# Patient Record
(demographics unavailable — no encounter records)

---

## 2024-10-16 NOTE — ASSESSMENT
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

## 2024-10-16 NOTE — HISTORY OF PRESENT ILLNESS
[de-identified] : Patient is a 62 y/o F who presents a chief complaint of newly diagnosed right arm melanoma, biopsied by Dr. Aguirre. Initially seen by Dr. Bernstein in 2015.   Dermpath 9/24/24: A. Right lower leg- solar keratosis B. Right arm- nodular melanoma which is focally ulceration, thickness at least 0.7 mm, tumor extends to the deep margin C. Right chest- c/w hyperplastic/hyperkeratotic solar keratosis/hypertrophic actinic keratosis which is inflamed   PMHx: History of breast cancer.  History of basal cell cancer 2000 on anterior chest. History of basal cell cancer on posterior trunk in 2011. Since 2001 she has had approximately 20 basal tumors and numerous Mohs surgeries. She was found to a melanoma over the skin of the left breast and anterior chest skin in 2011 (done by Dr. Bernstein)

## 2024-10-16 NOTE — PHYSICAL EXAM
[Normal] : supple, no neck mass and thyroid not enlarged [Normal Neck Lymph Nodes] : normal neck lymph nodes  [Normal Supraclavicular Lymph Nodes] : normal supraclavicular lymph nodes [Normal Groin Lymph Nodes] : normal groin lymph nodes [Normal Axillary Lymph Nodes] : normal axillary lymph nodes [Normal] : oriented to person, place and time, with appropriate affect [de-identified] : 8mm scar right arm - marked by dermatology, photograph taken, multiple other scars without recurrence, mult benign pigmented skin lesions

## 2024-10-16 NOTE — ADDENDUM
[FreeTextEntry1] : I, Lily Sebastian, acted solely as a scribe for Dr. Chandler Melton on this date 10/16/2024.

## 2024-10-16 NOTE — HISTORY OF PRESENT ILLNESS
[de-identified] : Patient is a 60 y/o F who presents a chief complaint of newly diagnosed right arm melanoma, biopsied by Dr. Aguirre. Initially seen by Dr. Bernstein in 2015.   Dermpath 9/24/24: A. Right lower leg- solar keratosis B. Right arm- nodular melanoma which is focally ulceration, thickness at least 0.7 mm, tumor extends to the deep margin C. Right chest- c/w hyperplastic/hyperkeratotic solar keratosis/hypertrophic actinic keratosis which is inflamed   PMHx: History of breast cancer.  History of basal cell cancer 2000 on anterior chest. History of basal cell cancer on posterior trunk in 2011. Since 2001 she has had approximately 20 basal tumors and numerous Mohs surgeries. She was found to a melanoma over the skin of the left breast and anterior chest skin in 2011 (done by Dr. Bernstein)

## 2024-10-16 NOTE — CONSULT LETTER
[Dear  ___] : Dear  [unfilled], [Consult Letter:] : I had the pleasure of evaluating your patient, [unfilled]. [Please see my note below.] : Please see my note below. [Sincerely,] : Sincerely, [FreeTextEntry3] : Chandler Melton MD FACS

## 2024-10-16 NOTE — PHYSICAL EXAM
[Normal] : supple, no neck mass and thyroid not enlarged [Normal Neck Lymph Nodes] : normal neck lymph nodes  [Normal Supraclavicular Lymph Nodes] : normal supraclavicular lymph nodes [Normal Groin Lymph Nodes] : normal groin lymph nodes [Normal Axillary Lymph Nodes] : normal axillary lymph nodes [Normal] : oriented to person, place and time, with appropriate affect [de-identified] : 8mm scar right arm - marked by dermatology, photograph taken, multiple other scars without recurrence, mult benign pigmented skin lesions

## 2024-12-01 NOTE — HISTORY OF PRESENT ILLNESS
[de-identified] : Patient is a 60 y/o F who presents a post op for right arm melanoma, biopsied by Dr. Aguirre.  S/p WEX on 11/12/24, pathology pending.   Dermpath 9/24/24: A. Right lower leg- solar keratosis B. Right arm- nodular melanoma which is focally ulceration, thickness at least 0.7 mm, tumor extends to the deep margin C. Right chest- c/w hyperplastic/hyperkeratotic solar keratosis/hypertrophic actinic keratosis which is inflamed   PMHx: History of breast cancer.  History of basal cell cancer 2000 on anterior chest. History of basal cell cancer on posterior trunk in 2011. Since 2001 she has had approximately 20 basal tumors and numerous Mohs surgeries. She was found to a melanoma over the skin of the left breast and anterior chest skin in 2011 (done by Dr. Bernstein) Initially seen by Dr. Benrstein in 2015.

## 2024-12-01 NOTE — ASSESSMENT
[FreeTextEntry1] : T1b right arm melanoma  S/p WEX and SLN bx  Normal postoperative course Follow up final pathology Follow up Dermatology RTO 3 months     enclosed pathology report when available

## 2024-12-01 NOTE — PHYSICAL EXAM
[Normal] : supple, no neck mass and thyroid not enlarged [Normal Neck Lymph Nodes] : normal neck lymph nodes  [Normal Supraclavicular Lymph Nodes] : normal supraclavicular lymph nodes [Normal Groin Lymph Nodes] : normal groin lymph nodes [Normal Axillary Lymph Nodes] : normal axillary lymph nodes [Normal] : oriented to person, place and time, with appropriate affect [de-identified] :  right arm and axillary incisions healing well w/o evidence of infection.  mult benign pigmented skin lesions

## 2024-12-01 NOTE — ADDENDUM
[FreeTextEntry1] : I, Lily Sebastian, acted solely as a scribe for Dr. Chandler Melton on this date 11/27/2024.

## 2024-12-01 NOTE — HISTORY OF PRESENT ILLNESS
[de-identified] : Patient is a 62 y/o F who presents a post op for right arm melanoma, biopsied by Dr. Aguirre.  S/p WEX on 11/12/24, pathology pending.   Dermpath 9/24/24: A. Right lower leg- solar keratosis B. Right arm- nodular melanoma which is focally ulceration, thickness at least 0.7 mm, tumor extends to the deep margin C. Right chest- c/w hyperplastic/hyperkeratotic solar keratosis/hypertrophic actinic keratosis which is inflamed   PMHx: History of breast cancer.  History of basal cell cancer 2000 on anterior chest. History of basal cell cancer on posterior trunk in 2011. Since 2001 she has had approximately 20 basal tumors and numerous Mohs surgeries. She was found to a melanoma over the skin of the left breast and anterior chest skin in 2011 (done by Dr. Bernstein) Initially seen by Dr. Bernstein in 2015.

## 2024-12-01 NOTE — PHYSICAL EXAM
[Normal] : supple, no neck mass and thyroid not enlarged [Normal Neck Lymph Nodes] : normal neck lymph nodes  [Normal Supraclavicular Lymph Nodes] : normal supraclavicular lymph nodes [Normal Groin Lymph Nodes] : normal groin lymph nodes [Normal Axillary Lymph Nodes] : normal axillary lymph nodes [Normal] : oriented to person, place and time, with appropriate affect [de-identified] :  right arm and axillary incisions healing well w/o evidence of infection.  mult benign pigmented skin lesions

## 2025-02-28 NOTE — PHYSICAL EXAM
[Normal] : supple, no neck mass and thyroid not enlarged [Normal Neck Lymph Nodes] : normal neck lymph nodes  [Normal Supraclavicular Lymph Nodes] : normal supraclavicular lymph nodes [Normal Groin Lymph Nodes] : normal groin lymph nodes [Normal Axillary Lymph Nodes] : normal axillary lymph nodes [Normal] : oriented to person, place and time, with appropriate affect [de-identified] :  right arm and axillary incisions healing well w/o evidence of infection.  mult benign pigmented skin lesions

## 2025-02-28 NOTE — HISTORY OF PRESENT ILLNESS
[de-identified] : Patient is a 63 y/o F who presents a 3 months follow up for T1b right arm melanoma, s/p WEX on 11/12/24.  Final path 11/12/24 -  1. Right axillary sentinel lymph node, biopsy: - 1 out of 1 sentinel lymph node is negative for metastatic malignant melanoma, see comment 2. Right arm, excision: - Residual malignant melanoma with ulceration, superficial spreading type, Breslow thickness 0.9 mm, T classification: pT1b, inked margins are free of tumor  Dermpath 9/24/24: A. Right lower leg- solar keratosis B. Right arm- nodular melanoma which is focally ulceration, thickness at least 0.7 mm, tumor extends to the deep margin C. Right chest- c/w hyperplastic/hyperkeratotic solar keratosis/hypertrophic actinic keratosis which is inflamed   PMHx: History of breast cancer.  History of basal cell cancer 2000 on anterior chest. History of basal cell cancer on posterior trunk in 2011. Since 2001 she has had approximately 20 basal tumors and numerous Mohs surgeries. She was found to a melanoma over the skin of the left breast and anterior chest skin in 2011 (done by Dr. Bernstein) Initially seen by Dr. Bernstein in 2015.

## 2025-02-28 NOTE — PHYSICAL EXAM
[Normal] : supple, no neck mass and thyroid not enlarged [Normal Neck Lymph Nodes] : normal neck lymph nodes  [Normal Supraclavicular Lymph Nodes] : normal supraclavicular lymph nodes [Normal Groin Lymph Nodes] : normal groin lymph nodes [Normal Axillary Lymph Nodes] : normal axillary lymph nodes [Normal] : oriented to person, place and time, with appropriate affect [de-identified] :  right arm and axillary incisions healing well w/o evidence of infection.  mult benign pigmented skin lesions

## 2025-02-28 NOTE — ASSESSMENT
[FreeTextEntry1] : T1b right arm melanoma  S/p WEX and SLN bx -  Follow up Dermatology RTO 3 months

## 2025-02-28 NOTE — HISTORY OF PRESENT ILLNESS
[de-identified] : Patient is a 63 y/o F who presents a 3 months follow up for T1b right arm melanoma, s/p WEX on 11/12/24.  Final path 11/12/24 -  1. Right axillary sentinel lymph node, biopsy: - 1 out of 1 sentinel lymph node is negative for metastatic malignant melanoma, see comment 2. Right arm, excision: - Residual malignant melanoma with ulceration, superficial spreading type, Breslow thickness 0.9 mm, T classification: pT1b, inked margins are free of tumor  Dermpath 9/24/24: A. Right lower leg- solar keratosis B. Right arm- nodular melanoma which is focally ulceration, thickness at least 0.7 mm, tumor extends to the deep margin C. Right chest- c/w hyperplastic/hyperkeratotic solar keratosis/hypertrophic actinic keratosis which is inflamed   PMHx: History of breast cancer.  History of basal cell cancer 2000 on anterior chest. History of basal cell cancer on posterior trunk in 2011. Since 2001 she has had approximately 20 basal tumors and numerous Mohs surgeries. She was found to a melanoma over the skin of the left breast and anterior chest skin in 2011 (done by Dr. Bernstein) Initially seen by Dr. Bernstein in 2015.